# Patient Record
Sex: MALE | Race: WHITE
[De-identification: names, ages, dates, MRNs, and addresses within clinical notes are randomized per-mention and may not be internally consistent; named-entity substitution may affect disease eponyms.]

---

## 2017-09-01 ENCOUNTER — HOSPITAL ENCOUNTER (OUTPATIENT)
Dept: HOSPITAL 80 - BHFA | Age: 62
End: 2017-09-01
Attending: INTERNAL MEDICINE
Payer: COMMERCIAL

## 2017-09-01 DIAGNOSIS — I25.10: Primary | ICD-10-CM

## 2017-09-01 PROCEDURE — 78452 HT MUSCLE IMAGE SPECT MULT: CPT

## 2017-09-01 PROCEDURE — 93017 CV STRESS TEST TRACING ONLY: CPT

## 2017-09-01 PROCEDURE — A9500 TC99M SESTAMIBI: HCPCS

## 2017-12-21 ENCOUNTER — HOSPITAL ENCOUNTER (OUTPATIENT)
Dept: HOSPITAL 80 - FIMAGING | Age: 62
End: 2017-12-21
Attending: ORTHOPAEDIC SURGERY
Payer: COMMERCIAL

## 2017-12-21 DIAGNOSIS — M48.02: Primary | ICD-10-CM

## 2017-12-21 DIAGNOSIS — G56.03: ICD-10-CM

## 2018-02-06 ENCOUNTER — HOSPITAL ENCOUNTER (OUTPATIENT)
Dept: HOSPITAL 80 - FIMAGING | Age: 63
End: 2018-02-06
Attending: NEUROLOGICAL SURGERY
Payer: COMMERCIAL

## 2018-02-06 DIAGNOSIS — M50.321: Primary | ICD-10-CM

## 2018-02-06 DIAGNOSIS — Z98.1: ICD-10-CM

## 2018-04-17 ENCOUNTER — HOSPITAL ENCOUNTER (OUTPATIENT)
Dept: HOSPITAL 80 - FPAT | Age: 63
End: 2018-04-17
Attending: NEUROLOGICAL SURGERY
Payer: COMMERCIAL

## 2018-04-17 DIAGNOSIS — Z01.812: ICD-10-CM

## 2018-04-17 DIAGNOSIS — Z01.810: Primary | ICD-10-CM

## 2018-04-17 LAB — PLATELET # BLD: 126 10^3/UL (ref 150–400)

## 2018-04-19 NOTE — CPEKG
Heart Rate: 91

RR Interval: 659

P-R Interval: 144

QRSD Interval: 92

QT Interval: 356

QTC Interval: 439

P Axis: 66

QRS Axis: 25

T Wave Axis: 19

EKG Severity - ABNORMAL ECG -

EKG Impression: SINUS RHYTHM

EKG Impression: INFERIOR INFARCT, AGE INDETERMINATE

Electronically Signed For: Rich Leiva 19-Apr-2018 09:13:21

## 2018-05-29 ENCOUNTER — HOSPITAL ENCOUNTER (INPATIENT)
Dept: HOSPITAL 80 - CED | Age: 63
LOS: 7 days | Discharge: HOME | DRG: 217 | End: 2018-06-05
Attending: THORACIC SURGERY (CARDIOTHORACIC VASCULAR SURGERY) | Admitting: INTERNAL MEDICINE
Payer: COMMERCIAL

## 2018-05-29 DIAGNOSIS — N52.9: ICD-10-CM

## 2018-05-29 DIAGNOSIS — Z95.5: ICD-10-CM

## 2018-05-29 DIAGNOSIS — I48.91: ICD-10-CM

## 2018-05-29 DIAGNOSIS — F41.9: ICD-10-CM

## 2018-05-29 DIAGNOSIS — I25.2: ICD-10-CM

## 2018-05-29 DIAGNOSIS — I21.4: Primary | ICD-10-CM

## 2018-05-29 DIAGNOSIS — I34.0: ICD-10-CM

## 2018-05-29 DIAGNOSIS — D62: ICD-10-CM

## 2018-05-29 DIAGNOSIS — I34.1: ICD-10-CM

## 2018-05-29 DIAGNOSIS — I31.9: ICD-10-CM

## 2018-05-29 DIAGNOSIS — Z87.891: ICD-10-CM

## 2018-05-29 DIAGNOSIS — I25.110: ICD-10-CM

## 2018-05-29 LAB — PLATELET # BLD: 114 10^3/UL (ref 150–400)

## 2018-05-29 PROCEDURE — C1887 CATHETER, GUIDING: HCPCS

## 2018-05-29 PROCEDURE — C1769 GUIDE WIRE: HCPCS

## 2018-05-29 PROCEDURE — G0378 HOSPITAL OBSERVATION PER HR: HCPCS

## 2018-05-29 PROCEDURE — P9041 ALBUMIN (HUMAN),5%, 50ML: HCPCS

## 2018-05-29 RX ADMIN — PANTOPRAZOLE SODIUM SCH MG: 40 TABLET, DELAYED RELEASE ORAL at 17:53

## 2018-05-29 RX ADMIN — DIAZEPAM PRN MG: 5 TABLET ORAL at 22:32

## 2018-05-29 NOTE — PDGENHP
History and Physical


History and Physical: 





CC:  Chest pain





HISTORY:  This patient is sent for admission from the emergency room in 

[place], after presenting with chest pain and having a mild elevation of 

cardiac troponin.


He has a history of myocardial infarction in 2007 with a stent placed at [facility] at that time by Dr. Farrar.  It sounds like he has not had 

any angiogram since then.  Notably he does continue to take aspirin and statin 

and does not use tobacco but some occasional marijuana.  He tells me that he 

has been having now for few months some exertional angina symptoms with his 

typical chest discomfort and dyspnea mimicking his original MI symptoms.  This 

is a tightness in the chest radiating in the left arm with a vague abnormal 

sensation adjacent to the right side of his head.  However this morning he had 

nonexertional and the that lasted quite a while.  He presented eventually to 

[facility] Emergency room and was found to have a minimally 

elevated cardiac troponin and is sent here for further assessment.  At this 

time his symptoms of angina have resolved.  However he is feeling very anxious.





He has had no changes in medication, no other recent illnesses or injuries.





ROS:  A comprehensive 10 system review revealed no other significant findings








PAST MEDICAL HISTORY:


Coronary disease with myocardial infarction and stent placement 2007


Anxiety disorder


Erectile dysfunction








FAMILY MEDICAL HISTORY:








SOCIAL HISTORY:


 lives with his wife in [place]


Is the martial artist


No tobacco but occasional use of marijuana








MEDICATIONS:


The patients list has been reconciled by our clinical pharmacist in the EMR.  

Medicines the have held from his home list at this time for his acute episode 

include testosterone, Cialis, albuterol, Aleve, and some herbs








PHYSICAL EXAMINATION:


Vital Signs:  All in normal range so far


Cardiac Monitor:  Sinus





Examination:


General: alert, oriented, good mentation, relaxed


Skin: warm, dry, good color, no rash


HEENT: normal


Neck: no mass or jvd


Resps: relaxed


Lungs: clear breath sounds


Heart: regular, no murmur


Abdomen: soft, nondistended, nontender, +BS, no mass


Upper Extremities: normal


Lower Extremities: no edema, warm


No Bleeding or bruising


Neurologic: normal speech/language, normal CNs, no focal weakness


IV site: looks normal








LABORATORY DATA:


Mild macrocytic anemia with hemoglobin 12 otherwise unremarkable CBC


1st troponin 0.089, glucose 104 otherwise normal basic metabolic panel





RADIOLOGY STUDIES:


No studies done yet





12 LEAD EKG:


My interpretation tracing, sinus rhythm, inferior Q-waves and nonspecific 

inferior T-wave abnormalities are noted, nothing that appears acute





ASSESSMENT:


# NSTEMI/UNSTABLE ANGINA WITH MILD TROPONIN ELEVATION


# KNOWN HISTORY OF CORONARY ARTERY DISEASE WITH PREVIOUS STENTING


# ANXIETY DISORDER








PLANS:


-observation status on Cardiac Unit


-I reviewed his current situation with Dr. Carlos Farrar and Heather Elizondo at 

this time in detail


-it seems that coronary angiography for further assessment will be prudent 

await Dr. Farrar assessment


-he has had high-dose aspirin today will continue daily aspirin and statin at 

this time


-depending on plans for angiography now or not, will consider adding beta-

blocker and heparin to keep things quite until he is study to


-cardiac monitoring here








I have reviewed the patient's case in detail with Dr. Heather Elizondo


I have reviewed the patient's past medical records as part of this assessment, 

including

## 2018-05-29 NOTE — EDPHY
H & P


Time Seen by Provider: 05/29/18 09:36


HPI/ROS: 





Chief Complaint:  Chest tightness, shortness of breath





HPI:  A 63-year-old male began having chest tightness about an hour ago with 

some associated shortness of breath and a headache.  Patient states he woke up 

normal this morning.  He does have a history of coronary disease and had a 

single cardiac stent placed about 10 years ago.  Last was seen by his 

cardiologist within the last 3 months who performed a preop checkup for a yet 

to be planned neck surgery.  Patient has been in his usual state of health.  

Patient states that the pain started after received a phone call from his 

 informing him that he had a court appointment at 9 o'clock this 

morning.  He denies any fevers or chills.  At worst a chest tightness is a 3/

10.  It was almost gone at this point.  No aggravating or alleviating factors.  

Headache came on gradually, is not the worst headache of his life.  No cough.  

Does not have a history of COPD or smoking cigarettes.





ROS:  10 point Review of Systems is negative except as noted in the HPI.





PMH:  Head injury, coronary artery disease, hyperlipidemia, chronic neck and 

back pain





Social History: No smoking, no alcohol, occasional marijuana





Family History:  Has a positive family history of coronary artery disease





Physical Exam:


Gen: Awake, Alert, anxious appearing, no respiratory distress


HEENT:  


     Nose: no rhinorrhea


     Eyes: PERRLA, EOMI


     Mouth: Moist mucosa 


Neck: Supple, no JVD


Chest: nontender, lungs clear to auscultation


Heart: S1, S2 normal, no murmur


Abd: Soft, non-tender, no guarding


Back: no CVA tenderness, no midline tenderness 


Ext: no edema, non-tender


Skin: no rash


Neuro: CN II-XII intact, Sensation grossly intact, Strength 5/5 in bilateral 

upper and lower extremities








- Personal History


Tetanus Vaccine Date: WITHIN 10 YRS





- Medical/Surgical History


Hx Diabetes: No


Hx Cardiac Disease: Yes


Other PMH: TBI, CAD, HIGH CHOLESTEROL, MI WITH STENT PLACEMENT, ORTHO SURG, 

PROSTATE SURG/TURP,occasional dental problems





- Social History


Smoking Status: Former smoker


Constitutional: 


 Initial Vital Signs











Temperature (C)  37.0 C   05/29/18 09:30


 


Heart Rate  87   05/29/18 09:30


 


Respiratory Rate  18   05/29/18 09:30


 


Blood Pressure  153/87 H  05/29/18 09:30


 


O2 Sat (%)  91 L  05/29/18 09:30








 











O2 Delivery Mode               Room Air


 


O2 (L/minute)                  2














Allergies/Adverse Reactions: 


 





No Known Allergies Allergy (Unverified 05/29/18 09:36)


 








Home Medications: 














 Medication  Instructions  Recorded


 


Rosuvastatin Calcium [Crestor 10mg 10 mg PO DAILY 01/02/13





(RX)]  


 


Dextroamphetamine/Amphetamine 25 mg PO DAILY 10/24/14





[Adderall Xr 25 mg Capsule]  


 


Diazepam [Valium 10 MG (*)] 10 mg PO HS PRN 06/11/15


 


Herbals/Supplements -Info Only 1 ea PO DAILY 04/09/18


 


Multivitamins [Multivitamin (*)] 1 each PO DAILY 04/09/18


 


Omeprazole 40 mg PO DAILY18 04/09/18


 


Testosterone IM [Testosterone 4 ml IM MO 04/09/18





100mg/ml IM inj (*)]  


 


Tadalafil DAILY 04/10/18














Medical Decision Making





- Diagnostics


EKG Interpretation: 





ECG time 9:40 a.m., sinus rhythm with a rate of 77, there are Q-waves 

indicating an old inferior infarct, no acute ST or T-wave changes.  Intervals 

are normal.  The ECG is unchanged compared to April 17, 2018. 


ED Course/Re-evaluation: 





63-year-old male with a history of coronary disease presenting with chest 

tightness and shortness of breath this morning.  Patient is no acute changes on 

his ECG but does have evidence for old inferior infarction.  He does have an 

elevation of his troponin which is not 3 times greater than reference range.  

This gives him a Heart Score of 4, 1.4 moderately suspicious history, 1 point 

for age, 1 point for 2 risk factors, and 1 point for elevated troponin which is 

not 3 times greater than the normal limit.  Given the score of 4 he is 

appropriate for inpatient further evaluation workup for acute coronary 

syndrome.  I have paged the hospitalist.  He has received 325 mg of aspirin 

here.





I have discussed with Rosalina Mccall, hospitalist.  Will admit to the PCU under 

Dr. Duncan for further evaluation.





- Data Points


Laboratory Results: 


 Laboratory Results





 05/29/18 09:45 





 05/29/18 09:45 





 











  05/29/18 05/29/18





  09:45 09:45


 


WBC    7.06 10^3/uL 10^3/uL





    (3.80-9.50) 


 


RBC    3.56 10^6/uL L 10^6/uL





    (4.40-6.38) 


 


Hgb    12.2 g/dL L g/dL





    (13.7-17.5) 


 


Hct    36.5 % L %





    (40.0-51.0) 


 


MCV    102.5 fL H fL





    (81.5-99.8) 


 


MCH    34.3 pg H pg





    (27.9-34.1) 


 


MCHC    33.4 g/dL g/dL





    (32.4-36.7) 


 


RDW    13.3 % %





    (11.5-15.2) 


 


Plt Count    114 10^3/uL L 10^3/uL





    (150-400) 


 


MPV    11.6 fL fL





    (8.7-11.7) 


 


Neut % (Auto)    63.6 % %





    (39.3-74.2) 


 


Lymph % (Auto)    22.5 % %





    (15.0-45.0) 


 


Mono % (Auto)    10.3 % %





    (4.5-13.0) 


 


Eos % (Auto)    2.1 % %





    (0.6-7.6) 


 


Baso % (Auto)    1.1 % %





    (0.3-1.7) 


 


Nucleat RBC Rel Count    0.0 % %





    (0.0-0.2) 


 


Absolute Neuts (auto)    4.48 10^3/uL 10^3/uL





    (1.70-6.50) 


 


Absolute Lymphs (auto)    1.59 10^3/uL 10^3/uL





    (1.00-3.00) 


 


Absolute Monos (auto)    0.73 10^3/uL 10^3/uL





    (0.30-0.80) 


 


Absolute Eos (auto)    0.15 10^3/uL 10^3/uL





    (0.03-0.40) 


 


Absolute Basos (auto)    0.08 10^3/uL 10^3/uL





    (0.02-0.10) 


 


Absolute Nucleated RBC    0.00 10^3/uL 10^3/uL





    (0-0.01) 


 


Immature Gran %    0.4 % %





    (0.0-1.1) 


 


Immature Gran #    0.03 10^3/uL 10^3/uL





    (0.00-0.10) 


 


Sodium  143 mEq/L mEq/L  





   (135-145)  


 


Potassium  4.1 mEq/L mEq/L  





   (3.3-5.0)  


 


Chloride  107 mEq/L mEq/L  





   ()  


 


Carbon Dioxide  25 mEq/l mEq/l  





   (22-31)  


 


Anion Gap  11 mEq/L mEq/L  





   (8-16)  


 


BUN  20 mg/dL mg/dL  





   (7-23)  


 


Creatinine  0.9 mg/dL mg/dL  





   (0.7-1.3)  


 


Estimated GFR  > 60   





   


 


Glucose  104 mg/dL H mg/dL  





   ()  


 


Calcium  9.0 mg/dL mg/dL  





   (8.5-10.4)  


 


Troponin I  0.089 ng/mL H ng/mL  





   (0.000-0.034)  











Medications Given: 


 








Discontinued Medications





Aspirin (Aspirin)  324 mg PO EDNOW ONE


   Stop: 05/29/18 09:38


   Last Admin: 05/29/18 09:46 Dose:  Not Given


Aspirin (Aspirin)  243 mg PO EDNOW ONE


   Stop: 05/29/18 09:44


   Last Admin: 05/29/18 09:45 Dose:  243 mg








Departure





- Departure


Disposition: [facility] Inpatient Acute


Clinical Impression: 


 Chest pain





Condition: Fair


Referrals: 


Pita Zhang MD [Primary Care Provider] - As per Instructions

## 2018-05-29 NOTE — GCON
[f 
rep st]



                                                                    CONSULTATION





DATE OF CONSULTATION:  05/29/2018



REQUESTING PHYSICIAN:  Dr. Hong Duncan.



REASON FOR CONSULTATION:  Unstable angina.



HISTORY:  The patient is a 63-year-old male, well known to me from over 10 
years of outpatient care.  He has a history of CAD with a prior inferior ST-
segment elevation myocardial infarction in 2007 which was treated with PCI of 
the RCA.  He has been stable since that time without recurrent ischemic 
episodes or need for repeat revascularization procedures.  I last saw him in 
July of 2016.  At that time, he was stable without significant symptoms.  Today
, he received an early morning phone call from his  who also happens to 
be his employer.  He received a message regarding a court appointment scheduled 
for 9:00.  Initially, he thought this was in relation to his work assignment.  
However, he soon realized that this was a court appointment regarding issues 
between himself and his ex-wife.  He experienced a great deal of stress.  He 
began to experience chest discomfort.  When his symptoms persisted, he decided 
to seek evaluation in the emergency room.  Eventually, his symptoms subsided 
spontaneously.  In the ER, his initial ECG did not demonstrate any acute 
ischemic findings.  His initial troponin is minimally elevated at 0.089.  As I 
visit with him this afternoon, he is symptom free.



PAST CARDIAC HISTORY AND TESTING:  As mentioned, he had an inferior ST-segment 
elevation myocardial infarction in February of 2007.  That episode of care took 
place at [facility].  His cardiac catheterization 
demonstrated overall preserved left ventricular systolic function.  He had an 
acute thrombotic occlusion of the RCA which was treated with PCI and placement 
of a single 3.5 x 16 mm Taxus drug coated stent.  He also had a chronic total 
occlusion of the proximal LAD with extremely vigorous right-to-left and left-to-
left collaterals.  Approximately 1 month later, he underwent a repeat 
catheterization and attempt to perform PCI on the chronic total occlusion of 
the left anterior descending.  That procedure was unsuccessful.  In light of 
his asymptomatic status, the decision was made to manage him medically as 
opposed to sending him for bypass surgery.  He has done extremely well.  His 
last ischemic assessment consists of a Lexiscan nuclear stress test performed 
in September of 2017.  That study demonstrated a fixed inferior defect and a 
moderate area of apical reversible ischemia.



PAST MEDICAL HISTORY:  In addition to his coronary disease, benign prostatic 
hypertrophy, depression, gastroesophageal reflux, hyperlipidemia, spinal 
stenosis, and traumatic brain injury.



SURGICAL HISTORY:  Includes cervical fusion, shoulder surgery, tonsillectomy, 
and TURP.



FAMILY HISTORY:  His father had coronary disease.



MEDICATIONS:  Please refer to the medication reconciliation section of the 
electronic chart.  Relevant cardiac medications include aspirin and 
rosuvastatin.



ALLERGIES:  No known drug allergies.



SOCIAL HISTORY:  He is .  He has a significant other with him in his 
room today.  He has adult offspring.  He is a former smoker, having stopped in 
1990.  He does not consume alcohol.



REVIEW OF SYSTEMS:  Apart from the chest discomfort that prompted this hospital 
encounter, a 10-point review was negative.



PHYSICAL EXAMINATION:  VITAL SIGNS:  Heart rate in the 70s with sinus rhythm on 
the monitor.  Blood pressure 125/83.  GENERAL:  This is a well-developed, well-
nourished male in no acute distress.  He is alert and oriented x3.  HEAD AND 
NECK:  No scleral icterus.  Mucous membranes moist.  Carotid pulses 2+ without 
bruits.  There is no JVD.  CHEST:  Lung fields clear to auscultation.  CARDIAC:
  Regular rate and rhythm with a normal S1, S2.  There is no murmur or gallop.  
ABDOMEN:  Soft, nontender, nondistended, with normal bowel sounds.  EXTREMITIES
:  2+ pulses and no peripheral edema.  An Marlo test on the right wrist was 
normal at less than 5 seconds.



LABORATORY STUDIES:  Sodium 143, potassium 4.1, BUN and creatinine 20 and 0.9.  
Troponin is 0.089.  His CBC demonstrates a white blood cell count of 7.06 with 
hemoglobin and hematocrit of 12.2 and 36.5.  Platelet count is 114,000. 



ECG:  His ECG demonstrates normal sinus rhythm.  There are inferior Q-waves, 
consistent with his previous MI.  There are no conduction system disturbances.  
There are no ST-T-wave changes suggestive of ischemia.



IMPRESSION:  This is a 63-year-old male who presents with acute onset anginal 
quality chest discomfort that arose in the context of emotional upset.  He is 
currently pain free.  His ECG does not demonstrate any acute ischemic changes.  
However, his troponin is minimally elevated.  He has not had a repeat heart 
catheterization since his original presentation in 2007.  Over the years, 
nuclear stress tests have demonstrated his fixed inferior infarct zone, along 
with a focus of apical ischemia, likely related to his collateralized left 
anterior descending.



PLAN:  The patient will be observed overnight.  He will have serial troponin 
levels.  At this point, the plan is to proceed with diagnostic cardiac 
catheterization and possible PCI tomorrow.





Job #:  [id number]

MTDD

## 2018-05-29 NOTE — CPEKG
Heart Rate: 77

RR Interval: 779

P-R Interval: 148

QRSD Interval: 90

QT Interval: 356

QTC Interval: 403

P Axis: 50

QRS Axis: -4

T Wave Axis: 8

EKG Severity - ABNORMAL ECG -

EKG Impression: SINUS RHYTHM

EKG Impression: INFERIOR INFARCT, AGE INDETERMINATE

Electronically Signed By: Júnior Marion 29-May-2018 14:40:51

## 2018-05-29 NOTE — ASMTCMCOM
CM Note

 

CM Note                       

Notes:

5/29/2018 Case Management Note



Reviewed chart.  Pt was admitted for chest pain and subsequent work up.



Pt is employed and has a significant other.  There are no PT or OT evals ordered at this time.



Case Management d/c poc:  to be determined.



Case Management to follow.

 

Date Signed:  05/29/2018 03:18 PM

Electronically Signed By:Gloria Badillo RN

## 2018-05-30 PROCEDURE — B2151ZZ FLUOROSCOPY OF LEFT HEART USING LOW OSMOLAR CONTRAST: ICD-10-PCS

## 2018-05-30 PROCEDURE — B2111ZZ FLUOROSCOPY OF MULTIPLE CORONARY ARTERIES USING LOW OSMOLAR CONTRAST: ICD-10-PCS

## 2018-05-30 PROCEDURE — 4A023N7 MEASUREMENT OF CARDIAC SAMPLING AND PRESSURE, LEFT HEART, PERCUTANEOUS APPROACH: ICD-10-PCS | Performed by: INTERNAL MEDICINE

## 2018-05-30 RX ADMIN — Medication SCH: at 16:22

## 2018-05-30 RX ADMIN — FOLIC ACID SCH: 1 TABLET ORAL at 16:22

## 2018-05-30 RX ADMIN — THERA TABS SCH: TAB at 16:22

## 2018-05-30 RX ADMIN — ASPIRIN 81 MG SCH MG: 81 TABLET ORAL at 10:21

## 2018-05-30 RX ADMIN — ROSUVASTATIN CALCIUM SCH: 10 TABLET, FILM COATED ORAL at 16:22

## 2018-05-30 RX ADMIN — DIAZEPAM PRN MG: 5 TABLET ORAL at 23:29

## 2018-05-30 RX ADMIN — ANTISEPTIC SURGICAL SCRUB SCH BTL: 0.04 SOLUTION TOPICAL at 20:45

## 2018-05-30 RX ADMIN — PANTOPRAZOLE SODIUM SCH MG: 40 TABLET, DELAYED RELEASE ORAL at 19:11

## 2018-05-30 NOTE — CPEKG
Heart Rate: 69

RR Interval: 870

P-R Interval: 148

QRSD Interval: 90

QT Interval: 396

QTC Interval: 425

P Axis: 50

QRS Axis: -2

T Wave Axis: 11

EKG Severity - ABNORMAL ECG -

EKG Impression: SINUS RHYTHM

EKG Impression: INFERIOR INFARCT, AGE INDETERMINATE

Electronically Signed By: Timi La 30-May-2018 11:58:00

## 2018-05-30 NOTE — PDMN
Medical Necessity


Medical necessity: Pt meets INPT criteria per MD as of 5/30/18 and MCG M-230 MI 

(NSTEMI with severe 3-vessel disease; CABG pending).

## 2018-05-30 NOTE — PDDXCAT
Diagnostic Cath Note





- .


Date: 05/30/18


: Charan


Indication: other (1)Unstable angina with mild troponin eleavation. 2) Known 

CAD.)





- Procedure


Access: right wrist


Procedure: left heart catheterization, coronary angiography, left ventriculogram

, other (Attemtped PCI of RCA)





- Materials


Left Heart Cath size: 5F


Left Heart Cath materials: other (TIG and Pigtail)





- Findings-Left Heart Catheterization


LM: Normal.


LAD: Mid-% with left-to-left collaterals.


LCX: Mid-circumflex 50%.


RCA: Flouroscopy reveals the presence of a previously placed stent in the 

proximal RCA. Angiography demonstrates the stent is widely patent. There is 

diffuse mild to moderate plaque throughout the RCA. The ostium of the posterior 

descending branch has a focal 90+% stenosis.


LVEF: 50%


Wall motion: Inferobasilar akinesis.


Estimated blood loss: <50ml


Closure method: TR Band


Assessment: 1) Low normal LVEF with inferobasilar akinesis from MI in 2007.  2) 

CAD as described above.


Plan: 


Will ask CT surgery service to consult for CABG.





Intervention: 


Based on the patient's clinical history and diagnostic angiography, the 

decision was made to attempt PCI of the ostial stenosis in the posterior 

descending branch. The patient received 2500 units of intravenous heparin in 

addition to the 5,000 units that had been given at the beginning of the 

procedure. A 6 [place] HockeyStick guide catheter was advanced to the RCA 

ostium. Multiple attempts were made at passing a guidewire into the posterior 

descending branch. An Intuition guidewire and Run-Through guidewire were both 

tried with reshaping of the tip of the wire several times each. The posterior 

descending branch could not be accessed and the procedure was terminated.





Patient Problems: 


 Problems











Problem Status Onset


 


Chest pain Acute

## 2018-05-30 NOTE — ASMTCMCOM
CM Note

 

CM Note                       

Notes:

5/30/2018 Case Management Note



Discussed pt during rounds this morning.  Pt has heart cath this afternoon that found significant 

blockages that they were unable to stent.  Dr. Grajeda to see pt to discuss surgical options for 

treatment.



Case Management d/c poc:  to be determined.



Case Management to follow.

 

Date Signed:  05/30/2018 03:55 PM

Electronically Signed By:Gloria Badillo RN

## 2018-05-30 NOTE — HOSPPROG
Hospitalist Progress Note


Assessment/Plan: 





DIAGNOSES: 


-NSTEMI


-CAD with progression noted on angiography, multiple vessels involved


-unable to pass stent through ostial lesion in the cath lab today


-mild macrocytic anemia on CBC





I reviewed with Dr. Farrar in detail after angiography.  The patient has 

significant myocardium at risk but technically could not be stented today in 

the cath lab.  There actually is anatomy potentially amenable to with triple 

bypass surgery so the current plan is to obtain surgical consultation at this 

time.


I reviewed the case with Dr. Talbert as well and he will see the patient this 

evening





PLANS:


-continue aspirin and statin


-consultation with Dr. Carlos Talbert of surgery


-will check B12 and folate levels with his anemia





SUBJECTIVE:


No recurrent angina here today so far


No shortness of breath


No other new symptoms








OBJECTIVE


Vitals reviewed:  Stable overall here so far


Cardiac Monitor, my review:  Sinus rhythm





Exam:


alert oriented 


skin warm dry color ok


resps not labored


lungs clear BSs


heart regular


abd soft nondistended nontender, bowel sounds present


limbs warm, no edema





iv site ok





Lab data:


He has adequate control of his LDL in the 50s











Objective: 


 Vital Signs











Temp Pulse Resp BP Pulse Ox


 


 36.2 C   73   15   125/82 H  90 L


 


 05/30/18 07:38  05/30/18 16:00  05/30/18 16:00  05/30/18 16:00  05/30/18 16:00








 Laboratory Results





 05/30/18 03:27 





 











 05/29/18 05/30/18 05/31/18





 06:59 06:59 06:59


 


Intake Total  720 


 


Balance  720 














- Time Spent With Patient


Time Spent with Patient: greater than 35 minutes


Time Spent with Patient: Greater than 35 minutes spent on this patients care, 

greater than 50% of time spent counseling, educating, and coordinating care 

regarding the above mentioned plan.





ICD10 Worksheet


Patient Problems: 


 Problems











Problem Status Onset


 


Chest pain Acute

## 2018-05-30 NOTE — GCON
[f rep st]



                                                                    CONSULTATION





DATE OF CONSULTATION:  05/30/2018



REFERRING PHYSICIAN:  Carols Farrar MD



REASON FOR CONSULTATION:  Patient is seen at the request of Dr. Farrar with the patient's marnie
n.



IMPRESSION:  

1.  Non-Q-wave infarction with severe 3-vessel disease and moderate LV dysfunction.  

2.  Anxiety disorder.  

3.  Erectile dysfunction.



RECOMMENDATIONS:  This gentleman should undergo coronary artery revascularization on this admission. 
 He has unstable angina with mild elevation of troponin on admission.  He was found to have severe 3-
vessel disease at cath today.  Please see cath report.  Previous medical history is positive for yasmine
te myocardial infarction in 2007, at which time, he had a stent.  He does have a strong family histor
y and denies any other significant risk factors.



REVIEW OF SYSTEMS:  Otherwise unremarkable.



SOCIAL HISTORY:  He works in security.  He is , or has a partner whom he lives with in [place].  He does not smoke.



MEDICATIONS:  On admission:  Please see admission H and P for medications.



PHYSICAL EXAMINATION:  GENERAL:  This is a well-developed, middle-aged gentleman lying comfortably in
 bed post cath.  No apparent distress.  HEENT:  Normocephalic.  PERRLA.  EOMI.  NECK:  Without bruit,
 adenopathy, thyromegaly.  HEART:  Rate is regular without murmur, S3 or S4.  LUNGS:  Clear.  ABDOMEN
:  Soft, nontender.  Bowel sounds are active.  RECTAL/GENITAL:  Exams were deferred.  NEUROLOGICAL:  
He is grossly intact.  Moves all extremities to command.  Pedal pulses are 2+ and symmetrical.



DIAGNOSTICS:  Please see cath report for details.  Carotid studies are pending.



PLAN:  Proceed with coronary bypass grafting in the morning.





Job #:  [id number]

## 2018-05-31 LAB — PLATELET # BLD: 99 10^3/UL (ref 150–400)

## 2018-05-31 PROCEDURE — 5A1221Z PERFORMANCE OF CARDIAC OUTPUT, CONTINUOUS: ICD-10-PCS | Performed by: THORACIC SURGERY (CARDIOTHORACIC VASCULAR SURGERY)

## 2018-05-31 PROCEDURE — 06BQ4ZZ EXCISION OF LEFT SAPHENOUS VEIN, PERCUTANEOUS ENDOSCOPIC APPROACH: ICD-10-PCS | Performed by: THORACIC SURGERY (CARDIOTHORACIC VASCULAR SURGERY)

## 2018-05-31 PROCEDURE — 02100Z8 BYPASS CORONARY ARTERY, ONE ARTERY FROM RIGHT INTERNAL MAMMARY, OPEN APPROACH: ICD-10-PCS | Performed by: THORACIC SURGERY (CARDIOTHORACIC VASCULAR SURGERY)

## 2018-05-31 PROCEDURE — 02UG0JZ SUPPLEMENT MITRAL VALVE WITH SYNTHETIC SUBSTITUTE, OPEN APPROACH: ICD-10-PCS | Performed by: THORACIC SURGERY (CARDIOTHORACIC VASCULAR SURGERY)

## 2018-05-31 PROCEDURE — 02L70CK OCCLUSION OF LEFT ATRIAL APPENDAGE WITH EXTRALUMINAL DEVICE, OPEN APPROACH: ICD-10-PCS | Performed by: THORACIC SURGERY (CARDIOTHORACIC VASCULAR SURGERY)

## 2018-05-31 PROCEDURE — 021209W BYPASS CORONARY ARTERY, THREE ARTERIES FROM AORTA WITH AUTOLOGOUS VENOUS TISSUE, OPEN APPROACH: ICD-10-PCS | Performed by: THORACIC SURGERY (CARDIOTHORACIC VASCULAR SURGERY)

## 2018-05-31 PROCEDURE — 02100Z9 BYPASS CORONARY ARTERY, ONE ARTERY FROM LEFT INTERNAL MAMMARY, OPEN APPROACH: ICD-10-PCS | Performed by: THORACIC SURGERY (CARDIOTHORACIC VASCULAR SURGERY)

## 2018-05-31 RX ADMIN — Medication SCH MLS: at 14:06

## 2018-05-31 RX ADMIN — THERA TABS SCH: TAB at 13:21

## 2018-05-31 RX ADMIN — ANTISEPTIC SURGICAL SCRUB SCH: 0.04 SOLUTION TOPICAL at 19:45

## 2018-05-31 RX ADMIN — Medication SCH: at 13:21

## 2018-05-31 RX ADMIN — PANTOPRAZOLE SODIUM SCH MG: 40 TABLET, DELAYED RELEASE ORAL at 21:18

## 2018-05-31 RX ADMIN — DOCUSATE SODIUM AND SENNOSIDES SCH: 50; 8.6 TABLET ORAL at 21:06

## 2018-05-31 RX ADMIN — ROSUVASTATIN CALCIUM SCH: 10 TABLET, FILM COATED ORAL at 13:21

## 2018-05-31 RX ADMIN — Medication SCH MLS: at 21:35

## 2018-05-31 RX ADMIN — ASPIRIN 81 MG SCH: 81 TABLET ORAL at 13:20

## 2018-05-31 RX ADMIN — MUPIROCIN SCH APP: 20 OINTMENT TOPICAL at 20:36

## 2018-05-31 RX ADMIN — PANTOPRAZOLE SODIUM SCH: 40 TABLET, DELAYED RELEASE ORAL at 17:44

## 2018-05-31 RX ADMIN — FOLIC ACID SCH: 1 TABLET ORAL at 13:20
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
## 2018-05-31 NOTE — HOSPPROG
Hospitalist Progress Note


Assessment/Plan: 





Patient seen by me today postoperatively in ICU.





DIAGNOSES: 


-NSTEMI/CAD with progression noted on angiography, multiple vessels involved


-now status post CABG x5 and mitral and tricuspid valve annuloplasty is


-mild macrocytic anemia on CBC





I reviewed with Dr. Farrar in detail after angiography.  The patient has 

significant myocardium at risk but technically could not be stented today in 

the cath lab.  There actually is anatomy potentially amenable to with triple 

bypass surgery so the current plan is to obtain surgical consultation at this 

time.


I reviewed the case with Dr. Talbert as well and he will see the patient this 

evening





PLANS:


-routine postop care per Cardiac surgery


-B12 and folate levels are pending to further assess macrocytosis





SUBJECTIVE:


He is having the expected pain after surgery but otherwise doing well.  No 

nausea.  Breathing comfortably.


He has had a stable postoperative course so far.





He did require in addition to 5 bypasses, mitral and tricuspid valve 

annuloplasties but these were done without that any significant difficulty








OBJECTIVE


Vitals reviewed:  Stable overall here so far


Cardiac Monitor, my review:  Sinus rhythm





Exam:


alert oriented 


Looks to be in some degree of discomfort but in no distress


skin warm dry color ok


resps not labored


Incision looks good, chest tubes in place with minimal output


lungs slightly rhonchorous


heart regular


abd soft nondistended nontender, bowel sounds present


limbs warm, no edema





iv site ok





Lab data:


He has adequate control of his LDL in the 50s











Objective: 


 Vital Signs











Temp Pulse Resp BP Pulse Ox


 


 35.7 C L  89   24 H  122/57 H  89 L


 


 05/31/18 16:00  05/31/18 17:00  05/31/18 17:00  05/31/18 17:00  05/31/18 17:00








 Laboratory Results





 05/30/18 03:27 





 











 05/30/18 05/31/18 06/01/18





 06:59 06:59 06:59


 


Intake Total 720  


 


Balance 720  














ICD10 Worksheet


Patient Problems: 


 Problems











Problem Status Onset


 


Acute blood loss anemia Acute  


 


CAD (coronary artery disease) Acute  


 


Chest pain Acute  


 


Mitral valve regurgitation Acute  


 


S/P CABG x 5 Acute  


 


S/P mitral valve repair Acute

## 2018-05-31 NOTE — GOP
[f rep st]



                                                                OPERATIVE REPORT





DATE OF OPERATION:  05/31/2018



SURGEON:  Carlos Grajeda DO



ASSISTANT:  Konstantin.



ANESTHESIA:  Arnold.



PREOPERATIVE DIAGNOSIS:  Non-Q-wave infarction with severe 3-vessel disease, moderate left ventricula
r dysfunction.



POSTOPERATIVE DIAGNOSIS:  Non-Q-wave infarction with severe 3-vessel disease, moderate left ventricul
ar dysfunction with evidence of severe mitral insufficiency with what appears to be A3 P3 prolapse.



PROCEDURE PERFORMED:  

1.  Coronary bypass grafting x5 with the left internal mammary artery to the ramus, right internal ma
mmary artery to the left anterior descending, saphenous vein graft to the 1st diagonal, saphenous vei
n graft to the posterior lateral circumflex, and saphenous vein graft to the posterior descending art
fabiola.

2.  Atrial clip to the left atrial appendage.

3.  Complex mitral valve repair with advancement of the lateral commissure utilizing "Magic Stitch" a
nd a #32 Physio annuloplasty ring.



FINDINGS:  Patient was noted to have unstable angina with troponin elevation.  He underwent diagnosti
c catheterization and noted to have severe 3-vessel disease.  Please see cath report.  Ventriculogram
 did not reveal any clear evidence of mitral insufficiency and no history of the same was noted. 



Intraoperatively, his transesophageal echo revealed moderate to severe mitral insufficiency with an a
nteriorly directed jet likely emanating from A3 P3 commissure.  There was also evidence of myxomatous
 valve degeneration and annular dilatation.  



I then obtained verbal consent from the patient's wife to proceed with repairing the mitral valve.





DESCRIPTION OF PROCEDURE:  Both mammaries were harvested.  He was then heparinized, cannulated.  Bypa
ss was begun.  A cardioplegic arrest was obtained with antegrade cardioplegia, retrograde cardioplegi
a topical hypothermia, and systemic cooling.  



Initially, the left atrial appendage was ligated with a 35 mm atrial clip.  We then proceeded with gr
afting a 2.5 mm good quality posterolateral circumflex with as excellent vein graft, which was harves
alondra endoscopically from the left leg by Zack Pryor, who first assisted throughout the procedure.  Th
e proximal anastomosis was completed in the standard fashion.  



We then proceeded with grafting the ramus branch, which was a 2.5 mm good quality vessel with an exce
llent quality left internal mammary artery brought through a lateral pericardial incision.  This was 
tacked to the epicardium.  



We then proceeded with grafting the 1st large diagonal, which was a 2.4 mm vessel with excellent qual
ity vein, which was brought off the ascending aorta.  



We then proceeded with grafting the PDA, which was a 2.8 mm vessel with excellent quality vein, savin
g the proximal anastomosis until after completion of the mitral valve.  



We then exposed the mitral valve through the right superior pulmonary vein.  The atrium was markedly 
enlarged.  The valve appeared myxomatous and with distention had marked prolapse of the lateral commi
ssure at A3 P3.  There were no ruptured chordae and there appeared to be annular dilatation.  Annulop
lasty sutures were placed with 2-0 Tycron.  We then retested the valve.  We then performed two 5-0 Pr
olene sutures at the lateral commissure to advance the commissure and reduce the prolapse and regurgi
tation laterally.  This did not compromise in-flow and upon distention, the regurgitation resolved.  
We then sized the patient for a 32 ring, which was sutured in place with Cor-Knots, again eliminating
 any regurgitation.  The left atrium was closed in standard fashion.  



We then proceeded with doing the proximal right anastomosis off the ascending aorta and then brought 
the right internal mammary artery across the midline to the mid LAD without tension and grafted that 
there.  Cross-clamp was then removed with suction on the ascending aortic vent.  Spontaneous cardiac 
activity was noted to resume.  The patient was easily weaned from bypass.  Heparin was reversed with 
protamine.  The cannula was removed and oversewn.  Transesophageal echo confirmed no mitral regurgita
tion with good coaptation and function and normal tricuspid.  2 ventricular pacing wires were placed.
  The thymic fat and pericardium were closed.  Two pleural and 1 mediastinal drains were placed.  The
 chest was closed in standard fashion.  Patient was returned to ICU in stable condition.





Job #:  [id number]

## 2018-05-31 NOTE — CPEKG
Heart Rate: 94

RR Interval: 638

P-R Interval: 144

QRSD Interval: 96

QT Interval: 368

QTC Interval: 461

P Axis: 72

QRS Axis: 67

T Wave Axis: 28

EKG Severity - ABNORMAL ECG -

EKG Impression: SINUS RHYTHM

EKG Impression: PROBABLE INFERIOR INFARCT, AGE INDETERMINATE

Electronically Signed By: Timi La 31-May-2018 13:55:55

## 2018-06-01 LAB
INR PPP: 1.22 (ref 0.83–1.16)
PLATELET # BLD: 98 10^3/UL (ref 150–400)
PROTHROMBIN TIME: 15.6 SEC (ref 12–15)

## 2018-06-01 RX ADMIN — METOPROLOL TARTRATE SCH MG: 25 TABLET, FILM COATED ORAL at 07:50

## 2018-06-01 RX ADMIN — HYDROMORPHONE HYDROCHLORIDE PRN MG: 2 TABLET ORAL at 23:35

## 2018-06-01 RX ADMIN — HYDROMORPHONE HYDROCHLORIDE PRN MG: 2 TABLET ORAL at 13:45

## 2018-06-01 RX ADMIN — HYDROCODONE BITARTRATE AND ACETAMINOPHEN PRN TAB: 5; 325 TABLET ORAL at 21:25

## 2018-06-01 RX ADMIN — HEPARIN SODIUM SCH: 5000 INJECTION, SOLUTION INTRAVENOUS; SUBCUTANEOUS at 06:29

## 2018-06-01 RX ADMIN — Medication SCH MLS: at 16:01

## 2018-06-01 RX ADMIN — Medication SCH MLS: at 06:25

## 2018-06-01 RX ADMIN — MUPIROCIN SCH APP: 20 OINTMENT TOPICAL at 07:51

## 2018-06-01 RX ADMIN — HEPARIN SODIUM SCH: 5000 INJECTION, SOLUTION INTRAVENOUS; SUBCUTANEOUS at 19:51

## 2018-06-01 RX ADMIN — HEPARIN SODIUM SCH UNIT: 5000 INJECTION, SOLUTION INTRAVENOUS; SUBCUTANEOUS at 16:01

## 2018-06-01 RX ADMIN — MUPIROCIN SCH APP: 20 OINTMENT TOPICAL at 21:30

## 2018-06-01 RX ADMIN — HYDROMORPHONE HYDROCHLORIDE PRN MG: 2 TABLET ORAL at 07:50

## 2018-06-01 RX ADMIN — Medication SCH MLS: at 22:18

## 2018-06-01 RX ADMIN — ASPIRIN 81 MG SCH MG: 81 TABLET ORAL at 07:50

## 2018-06-01 RX ADMIN — DOCUSATE SODIUM AND SENNOSIDES SCH TAB: 50; 8.6 TABLET ORAL at 07:50

## 2018-06-01 RX ADMIN — HYDROMORPHONE HYDROCHLORIDE PRN MG: 2 TABLET ORAL at 19:03

## 2018-06-01 RX ADMIN — METOPROLOL TARTRATE SCH MG: 25 TABLET, FILM COATED ORAL at 21:24

## 2018-06-01 RX ADMIN — DOCUSATE SODIUM AND SENNOSIDES SCH TAB: 50; 8.6 TABLET ORAL at 21:25

## 2018-06-01 NOTE — SOAPPROG
SOAP Progress Note


Assessment/Plan: 


Assessment: POD#1 urgent CABG x 5 (LIMA-RI, YULIYA-LAD, SV-D1, SV-PLC, SV-PDA), 

EVH , complex MV repair with a #32 Physio ring





NSTEMI/progressive CAD - Patent RCA stent, unsuccessful PCI culprit PDA 

stenosis. Fully revascularized with CABG. Secondary prevention w ASA, BB as 

tolerated, and statin when eating well.





ISCM - s/p remote inferobasilar MI. LVEF ~50%. Hemodynamically stable early 

postop course. No vasoactive support. No dysrhythmias. No sig volume overload. 

Staggered intro of heart failure meds as appropriate.





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 Antithrombotic 

prophylaxis with Coumadin, target INR 2-3, duration 3 mo.





Acute on chronic anemia - Preop H/H 12/36. Expected surgical blood loss. No 

transfusions required. VTE prophylaxis with SQ hep until INR > 1.7.





Postoperative pain - Exacerbated by BIMAs, bilat pleural tubes. Postop 

management with dilaudid PCA. Transition to multimodal analgesia planned. 

Anticipate improved control once tubes out.











Plan:


Routine POD#1 orders re wires, drains, orals and mobility.


Transition dilaudid PCA to oral and bolus dilaudid prn.


Start metoprolol 12.5 mg BID.


Start coumadin 2.5 mg today.


Probable tx to PCU later today.











06/01/18 06:19











Subjective: 





Hurts to move, hurts to talk. No dizziness or wobbliness getting OOB. No nausea.


Objective: 





 Vital Signs











Temp Pulse Resp BP Pulse Ox


 


 36.8 C   85   16   144/58 H  94 


 


 06/01/18 04:00  06/01/18 05:00  06/01/18 05:00  06/01/18 05:00  06/01/18 05:00








 Laboratory Results





 06/01/18 03:00 





 06/01/18 03:00 





 











 05/31/18 06/01/18 06/02/18





 05:59 05:59 05:59


 


Intake Total  218.2 


 


Output Total  1835 


 


Balance  -1616.8 








 











PT  15.6 SEC (12.0-15.0)  H  06/01/18  03:00    


 


INR  1.22  (0.83-1.16)  H  06/01/18  03:00    








Extubated yest afternoon without incident.


Moderate suppl O2 req sec to splinting. Started on dilaudid PCA.


Adequate diuresis.


CTOP thinning and dissipating.


CXR -> no PTX, no undrained effusions, mild pulm vasc congestion.


Labs as expected.














Physical Exam





- Physical Exam


General Appearance: alert, mild distress (cooperating with exam)


Respiratory: crackles (left sided), other (blakes x 3 y-d to pleurovac, 

serosang drainage, no air leak)


Cardiac/Chest: regular rate, rhythm, friction rub, other (Sternotomy CDI. TCPWs 

intact.)


Abdomen: soft, other (hypoactive BS)


Skin: warm/dry


Extremities: swelling (1+ gen), other (LLE venotomy CDI)





ICD10 Worksheet


Patient Problems: 


 Problems











Problem Status Onset


 


Acute blood loss anemia Acute  


 


CAD (coronary artery disease) Acute  


 


Chest pain Acute  


 


Mitral valve regurgitation Acute  


 


S/P CABG x 5 Acute  


 


S/P mitral valve repair Acute

## 2018-06-01 NOTE — ASMTCMCOM
CM Note

 

CM Note                       

Notes:

Patient had an urgent CABGx5 as a result of severe 3 vessel disease. OT/PT ordered. PT is 

recommending home care. CM will continue to monitor as recommendations come in. Dr. Grajeda may have 

recommendations as well. CM will follow.

 

Date Signed:  06/01/2018 03:45 PM

Electronically Signed By:Gladys Woods LCSW

## 2018-06-02 LAB
INR PPP: 1.28 (ref 0.83–1.16)
PROTHROMBIN TIME: 16.2 SEC (ref 12–15)

## 2018-06-02 RX ADMIN — METOPROLOL TARTRATE SCH MG: 25 TABLET, FILM COATED ORAL at 21:13

## 2018-06-02 RX ADMIN — HEPARIN SODIUM SCH: 5000 INJECTION, SOLUTION INTRAVENOUS; SUBCUTANEOUS at 19:51

## 2018-06-02 RX ADMIN — PANTOPRAZOLE SODIUM SCH MG: 40 TABLET, DELAYED RELEASE ORAL at 17:09

## 2018-06-02 RX ADMIN — METOPROLOL TARTRATE SCH MG: 25 TABLET, FILM COATED ORAL at 08:32

## 2018-06-02 RX ADMIN — FUROSEMIDE SCH MG: 10 INJECTION, SOLUTION INTRAMUSCULAR; INTRAVENOUS at 15:26

## 2018-06-02 RX ADMIN — MUPIROCIN SCH APP: 20 OINTMENT TOPICAL at 08:33

## 2018-06-02 RX ADMIN — HEPARIN SODIUM SCH: 5000 INJECTION, SOLUTION INTRAVENOUS; SUBCUTANEOUS at 11:45

## 2018-06-02 RX ADMIN — FUROSEMIDE SCH MG: 10 INJECTION, SOLUTION INTRAMUSCULAR; INTRAVENOUS at 09:34

## 2018-06-02 RX ADMIN — HYDROMORPHONE HYDROCHLORIDE PRN MG: 2 TABLET ORAL at 05:39

## 2018-06-02 RX ADMIN — HEPARIN SODIUM SCH: 5000 INJECTION, SOLUTION INTRAVENOUS; SUBCUTANEOUS at 06:29

## 2018-06-02 RX ADMIN — HYDROCODONE BITARTRATE AND ACETAMINOPHEN PRN TAB: 5; 325 TABLET ORAL at 03:02

## 2018-06-02 RX ADMIN — HYDROMORPHONE HYDROCHLORIDE PRN MG: 2 TABLET ORAL at 10:25

## 2018-06-02 RX ADMIN — HYDROCODONE BITARTRATE AND ACETAMINOPHEN PRN TAB: 5; 325 TABLET ORAL at 19:13

## 2018-06-02 RX ADMIN — DOCUSATE SODIUM AND SENNOSIDES SCH TAB: 50; 8.6 TABLET ORAL at 08:31

## 2018-06-02 RX ADMIN — Medication SCH: at 11:45

## 2018-06-02 RX ADMIN — COLCHICINE SCH MG: 0.6 CAPSULE ORAL at 09:34

## 2018-06-02 RX ADMIN — ASPIRIN 81 MG SCH MG: 81 TABLET ORAL at 08:31

## 2018-06-02 RX ADMIN — DOCUSATE SODIUM AND SENNOSIDES SCH TAB: 50; 8.6 TABLET ORAL at 21:13

## 2018-06-02 RX ADMIN — COLCHICINE SCH MG: 0.6 CAPSULE ORAL at 21:13

## 2018-06-02 NOTE — SOAPPROG
SOAP Progress Note


Assessment/Plan: 


Assessment: POD#2 urgent CABG x 5 (LIMA-RI, YULIYA-LAD, SV-D1, SV-PLC, SV-PDA), 

EVH , complex MV repair with a #32 Physio ring





NSTEMI/progressive CAD - Patent RCA stent, unsuccessful PCI culprit PDA 

stenosis. Fully revascularized with CABG. Secondary prevention w ASA, BB, and 

statin when eating well.





ISCM - s/p remote inferobasilar MI. LVEF ~50%. Hemodynamically stable early 

postop course. No vasoactive support. No dysrhythmias. No sig volume overload. 

Staggered intro of heart failure meds as appropriate.





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 Antithrombotic 

prophylaxis with Coumadin, target INR 2-3, duration 3 mo.





Acute on chronic anemia - Preop H/H 12/36. Expected surgical blood loss. No 

transfusions required. VTE prophylaxis with SQ hep until INR > 1.7.





Postoperative pain - Exacerbated by BIMAs, bilat pleural tubes, and probable 

pericarditis. Postop management with multimodal analgesia. Anticipate improved 

control once tubes out.











Plan:


Remove ant med and rt pleural drains.


Remove Vwires.


Hydrocortisone 100 mg IV x 1. Colchicine 0.6 mg BID x 2 wks.


Start daily diuresis.


Cont metoprolol 12.5 mg BID.


Cont coumadin 2.5 mg today.


Inc activity as tolerated.


Wean O2.


Tx to PCU.














06/02/18 07:27











Subjective: 





Better than yest, but still in considerable pain with any activity. 


Objective: 





 Vital Signs











Temp Pulse Resp BP Pulse Ox


 


 37.3 C   99   26 H  124/70 H  92 


 


 06/01/18 16:58  06/02/18 06:00  06/02/18 06:00  06/02/18 06:00  06/02/18 06:00








 Laboratory Results





 06/02/18 05:30 





 06/02/18 05:30 





 











 06/01/18 06/02/18 06/03/18





 05:59 05:59 05:59


 


Intake Total 942.2 3072 


 


Output Total 2035 1220 


 


Balance -1092.8 1852 








 











PT  16.2 SEC (12.0-15.0)  H  06/02/18  05:30    


 


INR  1.28  (0.83-1.16)  H  06/02/18  05:30    








SR/ST with diffuse upsloping STs c/w pericarditis.


Positive fluid balance. +6 kg overall.


CTOP approaching removal criteria.


Labs ok. Plt cont to rise. INR yet to budge.


 





Physical Exam





- Physical Exam


General Appearance: alert, no apparent distress (meditating)


Respiratory: decreased breath sounds (bases), other (blakes x 3 to bulb suction

, serosang drainage)


Cardiac/Chest: regular rate, rhythm, friction rub, other (Sternotomy and LLE 

venotomy CDI. Vwires intact.)


Abdomen: non-tender, soft


Skin: warm/dry


Extremities: swelling (1-2+ gen)





ICD10 Worksheet


Patient Problems: 


 Problems











Problem Status Onset


 


Acute blood loss anemia Acute  


 


CAD (coronary artery disease) Acute  


 


Chest pain Acute  


 


Mitral valve regurgitation Acute  


 


S/P CABG x 5 Acute  


 


S/P mitral valve repair Acute

## 2018-06-03 LAB
INR PPP: 1.86 (ref 0.83–1.16)
PLATELET # BLD: 92 10^3/UL (ref 150–400)
PROTHROMBIN TIME: 21.5 SEC (ref 12–15)

## 2018-06-03 RX ADMIN — ASPIRIN 81 MG SCH MG: 81 TABLET ORAL at 09:06

## 2018-06-03 RX ADMIN — Medication SCH: at 14:03

## 2018-06-03 RX ADMIN — HYDROCODONE BITARTRATE AND ACETAMINOPHEN PRN TAB: 5; 325 TABLET ORAL at 01:57

## 2018-06-03 RX ADMIN — ASPIRIN SCH MG: 325 TABLET, FILM COATED ORAL at 20:07

## 2018-06-03 RX ADMIN — METOPROLOL TARTRATE SCH MG: 25 TABLET, FILM COATED ORAL at 20:07

## 2018-06-03 RX ADMIN — PANTOPRAZOLE SODIUM SCH MG: 40 TABLET, DELAYED RELEASE ORAL at 17:34

## 2018-06-03 RX ADMIN — DOCUSATE SODIUM AND SENNOSIDES SCH TAB: 50; 8.6 TABLET ORAL at 20:07

## 2018-06-03 RX ADMIN — Medication SCH EACH: at 09:26

## 2018-06-03 RX ADMIN — HYDROCODONE BITARTRATE AND ACETAMINOPHEN PRN TAB: 5; 325 TABLET ORAL at 05:29

## 2018-06-03 RX ADMIN — THERA TABS SCH EACH: TAB at 09:26

## 2018-06-03 RX ADMIN — METOPROLOL TARTRATE SCH MG: 25 TABLET, FILM COATED ORAL at 09:07

## 2018-06-03 RX ADMIN — FOLIC ACID SCH MG: 1 TABLET ORAL at 09:26

## 2018-06-03 RX ADMIN — Medication SCH EA: at 09:26

## 2018-06-03 RX ADMIN — HYDROCODONE BITARTRATE AND ACETAMINOPHEN PRN TAB: 5; 325 TABLET ORAL at 23:40

## 2018-06-03 RX ADMIN — COLCHICINE SCH MG: 0.6 CAPSULE ORAL at 09:04

## 2018-06-03 RX ADMIN — DOCUSATE SODIUM AND SENNOSIDES SCH TAB: 50; 8.6 TABLET ORAL at 09:06

## 2018-06-03 RX ADMIN — HEPARIN SODIUM SCH: 5000 INJECTION, SOLUTION INTRAVENOUS; SUBCUTANEOUS at 06:26

## 2018-06-03 RX ADMIN — FUROSEMIDE SCH MG: 10 INJECTION, SOLUTION INTRAMUSCULAR; INTRAVENOUS at 09:06

## 2018-06-03 NOTE — HOSPPROG
Hospitalist Progress Note


Assessment/Plan: 





# macrocytic anemia, persistent since about 2017


   - B12, folate normal


   - no further inpatient workup for macrocytosis at this point;  follow h/h as 

needed post-op


   - continue to follow as an outpatient, consider heme referral


# ?DM - A1c pending - will follow up on this


Subjective: having a difficult day;  asked to see patient regarding his 

macrocytic anemia


Objective: 


 Vital Signs











Temp Pulse Resp BP Pulse Ox


 


 36.8 C   81   19   116/65   91 L


 


 06/03/18 12:00  06/03/18 12:00  06/03/18 12:00  06/03/18 12:00  06/03/18 12:00








 Laboratory Results





 06/03/18 05:15 





 06/03/18 05:15 





 











 06/02/18 06/03/18 06/04/18





 05:59 05:59 05:59


 


Intake Total 3072 1500 


 


Output Total 1220 3800 100


 


Balance 1852 -2300 -100








 











PT  21.5 SEC (12.0-15.0)  H  06/03/18  05:15    


 


INR  1.86  (0.83-1.16)  H  06/03/18  05:15    














- Physical Exam


Constitutional: uncomfortable


Cardiovascular: irregularly irregular, other (rub), No systolic murmur, No 

diastolic murmur


Respiratory: no respiratory distress, no rales or rhonchi, clear to auscultation


Gastrointestinal: soft, non-tender abdomen, no palpable masses, No guarding, No 

rebound





ICD10 Worksheet


Patient Problems: 


 Problems











Problem Status Onset


 


Acute blood loss anemia Acute  


 


S/P mitral valve repair Acute  


 


Mitral valve regurgitation Acute  


 


S/P CABG x 5 Acute  


 


CAD (coronary artery disease) Acute  


 


Chest pain Acute

## 2018-06-03 NOTE — SOAPPROG
SOAP Progress Note


Assessment/Plan: 


Assessment: POD#3 urgent CABG x 5 (LIMA-RI, YULIYA-LAD, SV-D1, SV-PLC, SV-PDA), 

EVH LLE, complex MV repair with a #32 Physio ring





NSTEMI/progressive CAD - Patent RCA stent, unsuccessful PCI culprit PDA 

stenosis. Fully revascularized with CABG. Secondary prevention w ASA, BB, and 

statin.





ISCM - s/p remote inferobasilar MI. LVEF ~50%. Hemodynamically stable early 

postop course. No vasoactive support. No dysrhythmias. No sig volume overload. 

Staggered intro of heart failure meds as appropriate.





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 Antithrombotic 

prophylaxis with Coumadin, target INR 2-3, duration 3 mo.





Acute on chronic anemia - Preop H/H 12/36. Expected surgical blood loss. No 

transfusions required. VTE prophylaxis with coumadin.





Postoperative pain - Exacerbated by BIMAs, bilat pleural tubes, and probable 

pericarditis. Postop management with multimodal analgesia. Anticipate improved 

control once tubes out.











Plan:


Remove left pleural drain.


Cont Colchicine 0.6 mg BID x 2 wks.


Cont BID diuresis.


Inc metoprolol to 25 mg BID.


Decr coumadin to 2 mg today.


Lighten narc analgesia.


Cont inc activity as tolerated.


Baseline postop echo tomorrow.














06/03/18 07:29

















Subjective: 





Feels dopey. Pain not as bad. Moving better.


Objective: 





 Vital Signs











Temp Pulse Resp BP Pulse Ox


 


 36.8 C   98   20   122/66 H  90 L


 


 06/03/18 04:00  06/03/18 04:00  06/03/18 04:00  06/03/18 04:00  06/03/18 04:00








 Laboratory Results





 06/03/18 05:15 





 06/03/18 05:15 





 











 06/02/18 06/03/18 06/04/18





 05:59 05:59 05:59


 


Intake Total 3072 1500 


 


Output Total 1220 3800 100


 


Balance 1852 -2300 -100








 











PT  21.5 SEC (12.0-15.0)  H  06/03/18  05:15    


 


INR  1.86  (0.83-1.16)  H  06/03/18  05:15    








SR/ST with PACs. Uptrending SBPs.


Improving fluid balance. + 3 kg overall.


CTOP at removal criteria.


Labs as expected.








- Pending Discharge


Pending Discharge Within 48 Hours: Yes


Pending Discharge Date: 06/05/18


Pending Discharge Time: 11:00





Physical Exam





- Physical Exam


General Appearance: alert, no apparent distress


Respiratory: decreased breath sounds (left base), other (zhen to bulb suction, 

mostly serous drainage)


Cardiac/Chest: regular rate, rhythm, friction rub, other (Sternotomy CDI)


Abdomen: non-tender, soft


Skin: warm/dry


Extremities: swelling (1+ dependent), other (LLE venotomy CDI)





ICD10 Worksheet


Patient Problems: 


 Problems











Problem Status Onset


 


Acute blood loss anemia Acute  


 


CAD (coronary artery disease) Acute  


 


Chest pain Acute  


 


Mitral valve regurgitation Acute  


 


S/P CABG x 5 Acute  


 


S/P mitral valve repair Acute

## 2018-06-04 LAB
INR PPP: 2.38 (ref 0.83–1.16)
PROTHROMBIN TIME: 26 SEC (ref 12–15)

## 2018-06-04 RX ADMIN — Medication SCH EA: at 08:32

## 2018-06-04 RX ADMIN — HYDROCODONE BITARTRATE AND ACETAMINOPHEN PRN TAB: 5; 325 TABLET ORAL at 04:28

## 2018-06-04 RX ADMIN — DOCUSATE SODIUM AND SENNOSIDES SCH TAB: 50; 8.6 TABLET ORAL at 08:28

## 2018-06-04 RX ADMIN — ROSUVASTATIN CALCIUM SCH MG: 10 TABLET, FILM COATED ORAL at 10:52

## 2018-06-04 RX ADMIN — METOPROLOL TARTRATE SCH MG: 25 TABLET, FILM COATED ORAL at 08:33

## 2018-06-04 RX ADMIN — FOLIC ACID SCH MG: 1 TABLET ORAL at 08:28

## 2018-06-04 RX ADMIN — DOCUSATE SODIUM AND SENNOSIDES SCH: 50; 8.6 TABLET ORAL at 21:12

## 2018-06-04 RX ADMIN — HYDROCODONE BITARTRATE AND ACETAMINOPHEN PRN TAB: 5; 325 TABLET ORAL at 23:17

## 2018-06-04 RX ADMIN — Medication SCH EACH: at 08:28

## 2018-06-04 RX ADMIN — Medication SCH: at 10:09

## 2018-06-04 RX ADMIN — HYDROCODONE BITARTRATE AND ACETAMINOPHEN PRN TAB: 5; 325 TABLET ORAL at 19:13

## 2018-06-04 RX ADMIN — PANTOPRAZOLE SODIUM SCH MG: 40 TABLET, DELAYED RELEASE ORAL at 17:22

## 2018-06-04 RX ADMIN — ASPIRIN SCH MG: 325 TABLET, FILM COATED ORAL at 08:28

## 2018-06-04 RX ADMIN — THERA TABS SCH EACH: TAB at 08:28

## 2018-06-04 RX ADMIN — METOPROLOL TARTRATE SCH MG: 25 TABLET, FILM COATED ORAL at 19:14

## 2018-06-04 RX ADMIN — ASPIRIN SCH MG: 325 TABLET, FILM COATED ORAL at 19:15

## 2018-06-04 RX ADMIN — Medication SCH: at 10:52

## 2018-06-04 NOTE — HOSPPROG
Hospitalist Progress Note


Assessment/Plan: 





Chart reviewed - patient not seen.  A1c still pending - I called the lab and 

they mistakenly had not run the A1c.  They will run now and I will follow up.





# macrocytic anemia, persistent since about 2017


   - B12, folate normal


   - no further inpatient workup for macrocytosis at this point;  follow h/h as 

needed post-op


   - continue to follow as an outpatient, consider heme referral


# ?DM - A1c pending - will follow up on this


Objective: 


 Vital Signs











Temp Pulse Resp BP Pulse Ox


 


 36.9 C   88   18   120/70   92 


 


 06/04/18 15:04  06/04/18 15:04  06/04/18 15:04  06/04/18 15:04  06/04/18 15:04








 Laboratory Results





 06/03/18 05:15 





 06/04/18 04:30 





 











 06/03/18 06/04/18 06/05/18





 05:59 05:59 05:59


 


Intake Total 1500 1350 236


 


Output Total 3800 2450 


 


Balance -2300 -1100 236








 











PT  26.0 SEC (12.0-15.0)  H  06/04/18  04:30    


 


INR  2.38  (0.83-1.16)  H  06/04/18  04:30    














ICD10 Worksheet


Patient Problems: 


 Problems











Problem Status Onset


 


Acute blood loss anemia Acute  


 


S/P mitral valve repair Acute  


 


Mitral valve regurgitation Acute  


 


S/P CABG x 5 Acute  


 


CAD (coronary artery disease) Acute  


 


Chest pain Acute

## 2018-06-04 NOTE — ECHO
[email or web address]





[address] [address] 

Main: [phone] 



Fax: 



Transthoracic Echocardiogram 

Name:            DINH WAN                              MR#:

[id number]

Study Date:      06/04/2018                            Study Time:

01:46 PM

YOB: 1955                            Age:

63 year(s)

Height:          182.9 cm (72 in.)                     Weight:

90.27 kg (199 lb.)

BSA:             2.13 m2                               Gender:

Male

Examination:     Echo                                  Indication:

BASELINE POST OP ECHO, PERICARDIAL



FRICTION RUB, ASSESS FOR PERICARIAL 

EFFUSION, S/P COMPLEX MV RPR #32 

PHYSIO RING 



Image Quality:   Adequate                              Contrast: 

Requested by:    Ting Gaston                              BP:

125 mmHg/72 mmHg

Heart Rate:                                            Rhythm: 

Indication:      BASELINE POST OP ECHO, PERICARDIAL FRICTION RUB,

ASSESS FOR PERICARIAL EFFUSION,



S/P COMPLEX MV RPR #32 PHYSIO RING 



Procedure Staff 

Ultrasound Technician:   Kristine Garland [facility] 

Reading Physician:       Fabricio Godinez MD 

Requesting Provider: 



Conclusions:          Normal global systolic LV function.  

The ejection fraction is visually estimated to be 55 %.  

There is moderate thickening of the mitral valve leaflets.  

Moderate mitral annular calcification.  

Small pericardial effusion. 



Measurements: 

Chambers                  Valvular Assessment AV/MV         Valvular

Assessment TV/PV



Normal                                Normal

Normal

Name        Value     Range             Name        Value Range

Name          Value Range

IVSd (2D):  1.1 cm (0.6 cm-1.1 



cm)   

LVDd (2D):  5.0 cm    (4.2 cm-5.9 



cm)   

LVPWd (2D): 1.1 cm    (0.6 cm-1 



cm)   

Visual EF:  55 % 



Continued Measurements: 

Chambers  



Name                   Value  

RA Area:              17.2 cm2   



Patient: DINH WAN                          MRN: [id number]

Study Date: 06/04/2018   Page 1 of 2

01:46 PM 









Findings: Left Ventricle: 

Normal size left ventricle. No LV hypertrophy. Normal global systolic

LV function. The ejection

fraction is visually estimated to be 55 %. No regional wall motion

abnormality. Unable to assess

diastolic dysfunction.  

Right Ventricle: 

Normal size right ventricle. Normal RV function.  

Left Atrium: 

The left atrium is normal in size.  

Right Atrium: 

The right atrium is normal in size.  

Mitral Valve: 

There is moderate thickening of the mitral valve leaflets. Moderate

mitral annular calcification.

Aortic Valve: 

The aortic valve is normal in appearance and function.  

Tricuspid Valve: 

The tricuspid valve is normal in appearance and function.  

Pulmonic Valve: 

The pulmonic valve is normal in appearance and function.  

Aorta: 

The aorta is normal.  

Pericardium: 

Small pericardial effusion. 







Electronically signed by Fabricio Godinez MD on 06/04/2018 at 03:39 PM



(No Signature Object) 



Patient: DINH WAN                          MRN: [id number]

Study Date: 06/04/2018   Page 2 of 2

01:46 PM 







[id number]

## 2018-06-05 VITALS — SYSTOLIC BLOOD PRESSURE: 109 MMHG | DIASTOLIC BLOOD PRESSURE: 71 MMHG

## 2018-06-05 LAB
INR PPP: 1.79 (ref 0.83–1.16)
PLATELET # BLD: 124 10^3/UL (ref 150–400)
PROTHROMBIN TIME: 20.9 SEC (ref 12–15)

## 2018-06-05 RX ADMIN — Medication SCH MG: at 09:04

## 2018-06-05 RX ADMIN — Medication SCH: at 08:59

## 2018-06-05 RX ADMIN — ROSUVASTATIN CALCIUM SCH MG: 10 TABLET, FILM COATED ORAL at 09:05

## 2018-06-05 RX ADMIN — Medication SCH EACH: at 09:04

## 2018-06-05 RX ADMIN — THERA TABS SCH EACH: TAB at 09:05

## 2018-06-05 RX ADMIN — FOLIC ACID SCH MG: 1 TABLET ORAL at 09:05

## 2018-06-05 RX ADMIN — ASPIRIN SCH MG: 325 TABLET, FILM COATED ORAL at 09:04

## 2018-06-05 RX ADMIN — DOCUSATE SODIUM AND SENNOSIDES SCH: 50; 8.6 TABLET ORAL at 09:00

## 2018-06-05 RX ADMIN — METOPROLOL TARTRATE SCH MG: 25 TABLET, FILM COATED ORAL at 09:05

## 2018-06-05 RX ADMIN — HYDROCODONE BITARTRATE AND ACETAMINOPHEN PRN TAB: 5; 325 TABLET ORAL at 04:15

## 2018-06-05 RX ADMIN — Medication SCH EA: at 09:04

## 2018-06-05 NOTE — SOAPPROG
SOAP Progress Note


Assessment/Plan: 


POD#5: Urgent CABG x 5 (LIMA-RI, YULIYA-LAD, SV-D1, SV-PLC, SV-PDA), EVH LLE, 

complex MV repair with a #32 Physio ring





NSTEMI/progressive CAD - CABGx5. Secondary prevention w ASA, BB, and statin.





ISCM - LVEF ~50% - staggered intro of heart failure meds as appropriate.





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 Antithrombotic 

prophylaxis with Coumadin, target INR 2-3, duration 3 mo.





Acute on chronic anemia - no transfusions required. 





Post-op PAF with RVR - Continue BB/amiodarone for AF prophylaxis. 

Thromboprophylaxis with Coumadin. 





DVT prophylaxis - Coumadin/SCDs. 





Disposition - home today without services.


Subjective: 


Feels well. Denies weakness/pain/SOB. 


Objective: 





 Vital Signs











Temp Pulse Resp BP Pulse Ox


 


 36.8 C   86   15   111/71   90 L


 


 06/04/18 23:05  06/05/18 04:17  06/05/18 04:17  06/05/18 04:17  06/05/18 04:17








 Laboratory Results





 06/05/18 04:15 





 06/04/18 04:30 





 











 06/04/18 06/05/18 06/06/18





 05:59 05:59 05:59


 


Intake Total 1350 1036 


 


Output Total 2450 650 


 


Balance -1100 386 








 











PT  20.9 SEC (12.0-15.0)  H  06/05/18  04:15    


 


INR  1.79  (0.83-1.16)  H  06/05/18  04:15    














Physical Exam





- Physical Exam


General Appearance: WD/WN, alert, no apparent distress


EENT: No scleral icterus (R), No scleral icterus (L)


Neck: normal inspection


Respiratory: No respiratory distress


Cardiac/Chest: regular rate, rhythm


Abdomen: non-tender, soft, No distended


Skin: normal color, warm/dry


Extremities: pedal edema


Neuro/Psych: no motor/sensory deficits, alert, normal mood/affect, oriented x 3





ICD10 Worksheet


Patient Problems: 


 Problems











Problem Status Onset


 


Acute blood loss anemia Acute  


 


CAD (coronary artery disease) Acute  


 


Chest pain Acute  


 


Mitral valve regurgitation Acute  


 


S/P CABG x 5 Acute  


 


S/P mitral valve repair Acute

## 2018-06-05 NOTE — PDDCSUM
Discharge Summary


Discharge Summary: 


ADMISSION DATE: 


5/29/18





DISCHARGE DATE:  


6/5/18





DISCHARGE DIAGNOSES


1. CAD


2. MR


3. Acute blood loss anemia 


4. Post-op atrial fibrillation with RVR 


5. Pericarditis





PROCEDURES


5/24/18, Carlos Grajeda: 


1. Urgent CABGx5 (LIMA-RI, YULIYA-LAD, SV-D1, SV-PLC, SV-PDA)


2. Complex MV repair with #32 Physio ring 


3. EVH left leg


4. AtriClip SUSI





HOSPITAL COURSE BY PROBLEM LIST 


1. CAD - s/p CABGx5. Secondary prevention with ASA, beta-blocker, and statin. 


2. MR - s/p complex repair. Thromboprophylaxis with Coumadin, INR goal 2-3, 

duration 3 months. 


3. Acute blood loss anemia - no transfusions necessary.


4. Post-op atrial fibrillation with RVR - conversion to SR with beta-blocker. 

Thromboprophylaxis as per MVR. 


5. Pericarditis - friction rub auscultated on PE. High dose ASA prescribed for 

2 weeks. 





CONDITION


Good 





DISPOSITION


Home, self care





ACTIVITY


Pt was instructed on sternal precautions, activity limitations, and which 

problems to call [facility] with. Please see Discharge Plan in chart for 

specifics. 





DISCHARGE MEDICATIONS


Continue:


Rosuvastatin Calcium [Crestor] 5 mg PO DAILY 


Dextroamphetamine/Amphetamine [Adderall Xr 25 mg Capsule] 25 mg PO DAILY 


Diazepam [Valium 10 MG (*)] 5 - 10 mg PO HS PRN 


Herbals/Supplements -Info Only 1 ea PO DAILY 


Multivitamins [Multivitamin (*)] 1 each PO DAILY 


Omeprazole 40 mg PO DAILY18 


Testosterone IM [Testosterone 100mg/ml IM inj (*)] 4 ml IM MO 


Carboxymethylcellulose 1% [Refresh Celluvisc (*)] 1 drop EACHEYE DAILY PRN 


Folic Acid [Folic Acid 1 MG (*)] 1 mg PO DAILY 


Glucosamine/Chondroitin [Glucosamine/Chondroitin (*)] 1 each PO DAILY 


Omega-3 Fatty Acids [Fish Oil 1000 mg (*)] 1,000 mg PO DAILY 


Vitamin B Complex [Vitamin B Complex (OTC)] 1 each PO DAILY 





New: 


Acetaminophen [Tylenol 325mg (*)] 325 - 650 mg PO Q4HRS PRN  


Aspirin [Aspirin 325 mg (*)] 650 mg PO BID 14 Days  


Furosemide [Lasix 20 MG (*)] 20 mg PO DAILY 30 Days 


Hydrocodone/APAP 5/325 [Norco 5/325 (*)] 1 - 2 tab PO Q4HRS PRN 


Metoprolol Tartrate [Lopressor 25 mg (*)] 37.5 mg PO BID


Warfarin Sodium [Coumadin 5MG (*)] 5 mg PO DAILY AT 4PM 





Stop:


Cialis 


Aleve


ASA 81 mg (continue after stopping high-dose ASA - 2 weeks)





PENDING STUDIES/LABS


1. CXR  prior to surgical follow-up 


2. INR/Coumadin mgmt as per  [facility] 





FOLLOW-UP


1. Carlos Grajeda, 6/12/18, 11:45 AM

## 2018-06-05 NOTE — PDHOMEO2F
Home Oxygen Face to Face


Home Orders: 


I certify that a physician or a nurse practitioner or physician's assistant has 

had a face-to-face encounter with this patient on the date of this order due to 

the diagnosis listed, which relates to the primary reason the patient requires 

home oxygen. Alternative treatments have been tried, or considered, and deemed 

ineffective. It is anticipated that supplemental oxygen will result in 

improvement with treatment.





Home oxygen qualifying diagnosis: atlectasis, hypoxemia, SOB, mitral 

insufficiency, CAD, s/p CABG/MV repair


SpO2 on room air (%): 85


Frequency of home oxygen needed: continuous


Home oxygen liters per minute: 3


Home oxygen delivery device: mask


Concentrator: Yes


E-tanks for mobility and back up: Yes


If ordering portable O2, is the patient mobile in the home?: Yes


I certify that, based on these findings, the home oxygen is medically necessary 

for this patient for the following length of time.





Length of time home oxygen needed: 1 month

## 2018-06-08 ENCOUNTER — HOSPITAL ENCOUNTER (OUTPATIENT)
Dept: HOSPITAL 80 - CIMAGING | Age: 63
End: 2018-06-08
Attending: THORACIC SURGERY (CARDIOTHORACIC VASCULAR SURGERY)
Payer: COMMERCIAL

## 2018-06-08 DIAGNOSIS — J98.11: ICD-10-CM

## 2018-06-08 DIAGNOSIS — Z95.1: ICD-10-CM

## 2018-06-08 DIAGNOSIS — Z09: Primary | ICD-10-CM

## 2018-06-08 DIAGNOSIS — J90: ICD-10-CM

## 2018-06-08 DIAGNOSIS — Z98.890: ICD-10-CM

## 2019-01-29 ENCOUNTER — HOSPITAL ENCOUNTER (OUTPATIENT)
Dept: HOSPITAL 80 - FIMAGING | Age: 64
End: 2019-01-29
Attending: ORTHOPAEDIC SURGERY
Payer: COMMERCIAL

## 2019-01-29 DIAGNOSIS — M75.121: Primary | ICD-10-CM

## 2019-01-29 DIAGNOSIS — M75.21: ICD-10-CM

## 2019-01-29 DIAGNOSIS — M75.81: ICD-10-CM

## 2019-01-29 DIAGNOSIS — M19.011: ICD-10-CM
